# Patient Record
Sex: FEMALE | Race: WHITE | URBAN - METROPOLITAN AREA
[De-identification: names, ages, dates, MRNs, and addresses within clinical notes are randomized per-mention and may not be internally consistent; named-entity substitution may affect disease eponyms.]

---

## 2021-09-13 ENCOUNTER — LAB ENCOUNTER (OUTPATIENT)
Dept: LAB | Facility: HOSPITAL | Age: 31
End: 2021-09-13
Attending: OBSTETRICS & GYNECOLOGY
Payer: COMMERCIAL

## 2021-09-13 ENCOUNTER — OFFICE VISIT (OUTPATIENT)
Dept: OBGYN CLINIC | Facility: CLINIC | Age: 31
End: 2021-09-13
Payer: COMMERCIAL

## 2021-09-13 VITALS — DIASTOLIC BLOOD PRESSURE: 60 MMHG | SYSTOLIC BLOOD PRESSURE: 100 MMHG | WEIGHT: 219.63 LBS

## 2021-09-13 DIAGNOSIS — N93.8 DUB (DYSFUNCTIONAL UTERINE BLEEDING): ICD-10-CM

## 2021-09-13 DIAGNOSIS — Z01.419 ENCOUNTER FOR WELL WOMAN EXAM WITH ROUTINE GYNECOLOGICAL EXAM: Primary | ICD-10-CM

## 2021-09-13 LAB
B-HCG SERPL-ACNC: <1 MIU/ML
BASOPHILS # BLD AUTO: 0.07 X10(3) UL (ref 0–0.2)
BASOPHILS NFR BLD AUTO: 0.7 %
DEPRECATED RDW RBC AUTO: 39.9 FL (ref 35.1–46.3)
EOSINOPHIL # BLD AUTO: 0.19 X10(3) UL (ref 0–0.7)
EOSINOPHIL NFR BLD AUTO: 1.8 %
ERYTHROCYTE [DISTWIDTH] IN BLOOD BY AUTOMATED COUNT: 13.2 % (ref 11–15)
HCT VFR BLD AUTO: 36.2 %
HGB BLD-MCNC: 11.3 G/DL
IMM GRANULOCYTES # BLD AUTO: 0.07 X10(3) UL (ref 0–1)
IMM GRANULOCYTES NFR BLD: 0.7 %
LYMPHOCYTES # BLD AUTO: 2.54 X10(3) UL (ref 1–4)
LYMPHOCYTES NFR BLD AUTO: 24.6 %
MCH RBC QN AUTO: 25.7 PG (ref 26–34)
MCHC RBC AUTO-ENTMCNC: 31.2 G/DL (ref 31–37)
MCV RBC AUTO: 82.3 FL
MONOCYTES # BLD AUTO: 0.44 X10(3) UL (ref 0.1–1)
MONOCYTES NFR BLD AUTO: 4.3 %
NEUTROPHILS # BLD AUTO: 7.01 X10 (3) UL (ref 1.5–7.7)
NEUTROPHILS # BLD AUTO: 7.01 X10(3) UL (ref 1.5–7.7)
NEUTROPHILS NFR BLD AUTO: 67.9 %
PLATELET # BLD AUTO: 532 10(3)UL (ref 150–450)
RBC # BLD AUTO: 4.4 X10(6)UL
T4 FREE SERPL-MCNC: 0.9 NG/DL (ref 0.8–1.7)
TSI SER-ACNC: 2.54 MIU/ML (ref 0.36–3.74)
WBC # BLD AUTO: 10.3 X10(3) UL (ref 4–11)

## 2021-09-13 PROCEDURE — 36415 COLL VENOUS BLD VENIPUNCTURE: CPT

## 2021-09-13 PROCEDURE — 84439 ASSAY OF FREE THYROXINE: CPT

## 2021-09-13 PROCEDURE — 84702 CHORIONIC GONADOTROPIN TEST: CPT

## 2021-09-13 PROCEDURE — 99203 OFFICE O/P NEW LOW 30 MIN: CPT | Performed by: OBSTETRICS & GYNECOLOGY

## 2021-09-13 PROCEDURE — 85025 COMPLETE CBC W/AUTO DIFF WBC: CPT

## 2021-09-13 PROCEDURE — 3078F DIAST BP <80 MM HG: CPT | Performed by: OBSTETRICS & GYNECOLOGY

## 2021-09-13 PROCEDURE — 84443 ASSAY THYROID STIM HORMONE: CPT

## 2021-09-13 PROCEDURE — 3074F SYST BP LT 130 MM HG: CPT | Performed by: OBSTETRICS & GYNECOLOGY

## 2021-09-13 NOTE — PROGRESS NOTES
Subjective:   Patient ID: Preet Cardenas is a 36year old female. Patient reports DUB since April. Prior to this patient reports oligomenorrhea. Had menses one or twice a year.   Discussed risk of uterine cancer with infrequent menses and discussed E routine gynecological exam  (primary encounter diagnosis)  DUB (dysfunctional uterine bleeding)  All questions answered. F/U pap smear and Labs. EMBx procedure discussed. To premedicate 30 minutes prior to biopsy.   Orders Placed This Encounter      Hpv

## 2021-09-14 ENCOUNTER — TELEPHONE (OUTPATIENT)
Dept: OBGYN CLINIC | Facility: CLINIC | Age: 31
End: 2021-09-14

## 2021-09-14 LAB — HPV I/H RISK 1 DNA SPEC QL NAA+PROBE: NEGATIVE

## 2021-09-14 NOTE — TELEPHONE ENCOUNTER
----- Message from Stefany Amaya MD sent at 9/13/2021  3:31 PM CDT -----  Thyroid function is normal,  Quant BHCG is Negative and CBC only shows very mild anemia. Patient can start taking Iron daily and she is to keep her appointment for an EMBx.   Agustin

## 2021-09-29 ENCOUNTER — OFFICE VISIT (OUTPATIENT)
Dept: OBGYN CLINIC | Facility: CLINIC | Age: 31
End: 2021-09-29
Payer: COMMERCIAL

## 2021-09-29 VITALS — WEIGHT: 220.81 LBS | SYSTOLIC BLOOD PRESSURE: 116 MMHG | DIASTOLIC BLOOD PRESSURE: 60 MMHG

## 2021-09-29 DIAGNOSIS — Z01.812 PRE-PROCEDURAL LABORATORY EXAMINATION: Primary | ICD-10-CM

## 2021-09-29 DIAGNOSIS — N93.8 DUB (DYSFUNCTIONAL UTERINE BLEEDING): ICD-10-CM

## 2021-09-29 DIAGNOSIS — N92.6 IRREGULAR MENSTRUAL CYCLE: ICD-10-CM

## 2021-09-29 LAB
CONTROL LINE PRESENT WITH A CLEAR BACKGROUND (YES/NO): YES YES/NO
KIT LOT #: NORMAL NUMERIC
PREGNANCY TEST, URINE: NEGATIVE

## 2021-09-29 PROCEDURE — 58100 BIOPSY OF UTERUS LINING: CPT | Performed by: OBSTETRICS & GYNECOLOGY

## 2021-09-29 PROCEDURE — 3078F DIAST BP <80 MM HG: CPT | Performed by: OBSTETRICS & GYNECOLOGY

## 2021-09-29 PROCEDURE — 3074F SYST BP LT 130 MM HG: CPT | Performed by: OBSTETRICS & GYNECOLOGY

## 2021-09-29 PROCEDURE — 81025 URINE PREGNANCY TEST: CPT | Performed by: OBSTETRICS & GYNECOLOGY

## 2021-09-29 RX ORDER — MEDROXYPROGESTERONE ACETATE 10 MG/1
10 TABLET ORAL DAILY
Qty: 11 TABLET | Refills: 0 | Status: SHIPPED | OUTPATIENT
Start: 2021-09-29 | End: 2021-12-08 | Stop reason: ALTCHOICE

## 2021-09-29 NOTE — PROCEDURES
Endometrial Biopsy    Pre-Procedure Care:   Consent was obtained. Procedure/risks were explained. Questions were answered. Correct patient was identified. Correct side and site were confirmed.     Pregnancy Results: negative from urine test       Pre-Me

## 2021-10-01 ENCOUNTER — TELEPHONE (OUTPATIENT)
Dept: OBGYN CLINIC | Facility: CLINIC | Age: 31
End: 2021-10-01

## 2021-10-01 NOTE — TELEPHONE ENCOUNTER
----- Message from Tabitha Mensah MD sent at 10/1/2021  9:20 AM CDT -----  EMBx is Normal.  No malignancy identified. Unable to contact patient. Please try and contact patient.   I started her on Provera and I would like to know if her bleeding is stop

## 2021-10-07 NOTE — TELEPHONE ENCOUNTER
Patient name and  verified. Patient informed of MLM result note. States currently taking provera and bleeding had decreased. Advised patient to finish 11 day course of Provera and to contact office if bleeding continues. Verbalized understanding.

## 2021-10-13 ENCOUNTER — TELEPHONE (OUTPATIENT)
Dept: OBGYN CLINIC | Facility: CLINIC | Age: 31
End: 2021-10-13

## 2021-10-13 DIAGNOSIS — N93.8 DUB (DYSFUNCTIONAL UTERINE BLEEDING): Primary | ICD-10-CM

## 2021-10-13 RX ORDER — NORGESTIMATE AND ETHINYL ESTRADIOL 0.25-0.035
KIT ORAL
Qty: 84 TABLET | Refills: 0 | Status: SHIPPED | OUTPATIENT
Start: 2021-10-13 | End: 2021-12-08

## 2021-10-13 NOTE — TELEPHONE ENCOUNTER
Please send in Ortho Cyclen x 3 packs. Patient should start today and armaan 1 tab TID x 4 days. On Sunday the patient should start a new pack but only take 1 tab daily.   After finishing the second pack she should start the third pack but also come and see

## 2021-10-13 NOTE — TELEPHONE ENCOUNTER
Patient informed of MLM note and recommendations. RX sent to pharmacy. Dosing schedule reviewed with patient and instructions written on RX. Aware to schedule follow up visit with MLM while on third pack of medication.  Verbalized understanding and encourag

## 2021-10-13 NOTE — TELEPHONE ENCOUNTER
Patient name and  verified. Patient recently finished 11 day course of Provera on Monday 10/11/2021 complaining of heavy bleeding. States she is changing pads every 2 hours and 4/10 abdominal pain. Bleeding precautions reviewed with patient.  Advised

## 2021-10-13 NOTE — TELEPHONE ENCOUNTER
Reviewed instructions per [facility] note, per Md since its late in the day. Pt to take 2 ocp tablets today, then take ocp TID tomorrow, Friday and Saturday. Pt then will discard the pack and start a new one on Sunday taking it, one tablet daily.  Pt to make apt w

## 2021-10-25 NOTE — TELEPHONE ENCOUNTER
Patient is doing the right thing. It may take two or three packs of pills to be regulated. Will see what CBC shows and also waiting to see Pelvic ultrasound report when patient has this done.

## 2021-10-25 NOTE — TELEPHONE ENCOUNTER
Pt is taking medication as prescribed but Saturday 10/23 pt started bleeding and is concerned. Please advise.

## 2021-10-25 NOTE — TELEPHONE ENCOUNTER
Order for CBC entered for patient to complete. Patient states she has been taking OCP as prescribed. First pack patient was taking ocp 3 times daily for 4 days and began new ocp pack Francisco 10/17.  States now only takes one pill daily at same time every

## 2021-10-25 NOTE — TELEPHONE ENCOUNTER
Patient name and  verified. Patient states she has been bleeding since April. Per patient she began OCP on 10/13/2021. States she noticed that bleeding decreased but heavy bleeding resumed 2 day ago.  States she has been passing all different sized c

## 2021-10-25 NOTE — TELEPHONE ENCOUNTER
Patient informed of MLM note and recommendations. Bleeding precautions reviewed again with patient. Verbalized understanding and no further questions.

## 2021-10-25 NOTE — TELEPHONE ENCOUNTER
Send the patient to the Lab of a CBC. Diagnosis is DUB. Also ask the patient how did the patient take the OCPs when she started on 10/13/2021 after talking to [facility]? Patient should continue the OCPs that she is currently taking. DO NOT STOP.   Call regina

## 2021-10-27 ENCOUNTER — HOSPITAL ENCOUNTER (OUTPATIENT)
Dept: ULTRASOUND IMAGING | Facility: HOSPITAL | Age: 31
Discharge: HOME | End: 2021-10-27
Attending: OBSTETRICS & GYNECOLOGY
Payer: COMMERCIAL

## 2021-10-27 ENCOUNTER — LAB ENCOUNTER (OUTPATIENT)
Dept: LAB | Facility: HOSPITAL | Age: 31
End: 2021-10-27
Attending: OBSTETRICS & GYNECOLOGY
Payer: COMMERCIAL

## 2021-10-27 DIAGNOSIS — N93.8 DUB (DYSFUNCTIONAL UTERINE BLEEDING): ICD-10-CM

## 2021-10-27 PROCEDURE — 36415 COLL VENOUS BLD VENIPUNCTURE: CPT

## 2021-10-27 PROCEDURE — 85025 COMPLETE CBC W/AUTO DIFF WBC: CPT

## 2021-10-27 PROCEDURE — 76830 TRANSVAGINAL US NON-OB: CPT | Performed by: OBSTETRICS & GYNECOLOGY

## 2021-10-27 PROCEDURE — 76856 US EXAM PELVIC COMPLETE: CPT | Performed by: OBSTETRICS & GYNECOLOGY

## 2021-10-28 ENCOUNTER — TELEPHONE (OUTPATIENT)
Dept: OBGYN CLINIC | Facility: CLINIC | Age: 31
End: 2021-10-28

## 2021-11-16 ENCOUNTER — TELEPHONE (OUTPATIENT)
Dept: OBGYN CLINIC | Facility: CLINIC | Age: 31
End: 2021-11-16

## 2021-11-16 NOTE — TELEPHONE ENCOUNTER
Patient does not need to come in. She should stop the pills for her Placebo week then start the new pack. Patient can take Motrin as directed for cramping. Call patient.

## 2021-11-16 NOTE — TELEPHONE ENCOUNTER
Pt wanted to let MLM she stopped bleeding but would like to speak with the nurse.  Please advise Algerian speaking

## 2021-11-16 NOTE — TELEPHONE ENCOUNTER
Pt Name and  verified. Pt is calling in regards to bleeding. States that she is no longer on her bleeding after this past . However, she does have pelvic cramping. Pt wants to know if she should come in for f/u apt.  Pt also wanted to know if she

## 2021-12-08 ENCOUNTER — TELEPHONE (OUTPATIENT)
Dept: OBGYN CLINIC | Facility: CLINIC | Age: 31
End: 2021-12-08

## 2021-12-08 RX ORDER — NORGESTIMATE AND ETHINYL ESTRADIOL 0.25-0.035
1 KIT ORAL DAILY
Qty: 28 TABLET | Refills: 0 | Status: SHIPPED | OUTPATIENT
Start: 2021-12-08 | End: 2021-12-16

## 2021-12-08 NOTE — TELEPHONE ENCOUNTER
Patient was not told to contact office once she finished her OCPs. She was told to come in for an appointment to see me when she was on her third pack before she finished it. I do think this is her period and she should start her next pack this Sunday.

## 2021-12-08 NOTE — TELEPHONE ENCOUNTER
Patient name and  verified. Patient with a history of irregular menses was informed to contact the office once she finished OCP pack. Patient states she started spotting yesterday and believes it may be her menses.  States flow is not heavy currently

## 2021-12-08 NOTE — TELEPHONE ENCOUNTER
Patient name and  verified. Patient informed of MLM message and recommendations. OCP order sent to pharmacy per [facility] message. Patient scheduled on 2021 for medication check. Aware of time and location.

## 2021-12-08 NOTE — TELEPHONE ENCOUNTER
Pt states she finished her medication and 2 days after started bleeding again.  Please advise Arabic speaking

## 2021-12-13 ENCOUNTER — TELEPHONE (OUTPATIENT)
Dept: OBGYN CLINIC | Facility: CLINIC | Age: 31
End: 2021-12-13

## 2021-12-13 NOTE — TELEPHONE ENCOUNTER
Pt needs instructions on dose for  Norgestimate.  She was suppose to start on RX yesterday  Kazakh speaking

## 2021-12-13 NOTE — TELEPHONE ENCOUNTER
Pt Name and  verified. Pt states that she was unable to  OCP last night as her insurance would not cover until today. Would like to know if she should take two pills today to get back on track as she didn't take it yesterday.  Advised that this i

## 2021-12-16 ENCOUNTER — OFFICE VISIT (OUTPATIENT)
Dept: OBGYN CLINIC | Facility: CLINIC | Age: 31
End: 2021-12-16
Payer: COMMERCIAL

## 2021-12-16 VITALS — SYSTOLIC BLOOD PRESSURE: 107 MMHG | DIASTOLIC BLOOD PRESSURE: 72 MMHG | WEIGHT: 211.19 LBS | HEART RATE: 97 BPM

## 2021-12-16 DIAGNOSIS — Z30.41 ENCOUNTER FOR SURVEILLANCE OF CONTRACEPTIVE PILLS: Primary | ICD-10-CM

## 2021-12-16 PROCEDURE — 99213 OFFICE O/P EST LOW 20 MIN: CPT | Performed by: OBSTETRICS & GYNECOLOGY

## 2021-12-16 PROCEDURE — 3074F SYST BP LT 130 MM HG: CPT | Performed by: OBSTETRICS & GYNECOLOGY

## 2021-12-16 PROCEDURE — 3078F DIAST BP <80 MM HG: CPT | Performed by: OBSTETRICS & GYNECOLOGY

## 2021-12-16 RX ORDER — NORGESTIMATE AND ETHINYL ESTRADIOL 0.25-0.035
1 KIT ORAL DAILY
Qty: 84 TABLET | Refills: 2 | Status: SHIPPED | OUTPATIENT
Start: 2021-12-16 | End: 2022-01-27 | Stop reason: ALTCHOICE

## 2021-12-16 NOTE — PROGRESS NOTES
Subjective:   Patient ID: Dolores Sage is a 36year old female. Patient here for medication check. On Ortho Cyclen to regulate irregular heavy menses. Menses much better. Desires to continue with OCPs. All questions answered.   This is a 20 madina

## 2022-01-10 ENCOUNTER — OFFICE VISIT (OUTPATIENT)
Dept: OBGYN CLINIC | Facility: CLINIC | Age: 32
End: 2022-01-10
Payer: COMMERCIAL

## 2022-01-10 ENCOUNTER — TELEPHONE (OUTPATIENT)
Dept: OBGYN CLINIC | Facility: CLINIC | Age: 32
End: 2022-01-10

## 2022-01-10 VITALS — WEIGHT: 211 LBS | SYSTOLIC BLOOD PRESSURE: 112 MMHG | DIASTOLIC BLOOD PRESSURE: 70 MMHG

## 2022-01-10 DIAGNOSIS — Z30.41 ENCOUNTER FOR SURVEILLANCE OF CONTRACEPTIVE PILLS: Primary | ICD-10-CM

## 2022-01-10 PROCEDURE — 99213 OFFICE O/P EST LOW 20 MIN: CPT | Performed by: OBSTETRICS & GYNECOLOGY

## 2022-01-10 PROCEDURE — 3074F SYST BP LT 130 MM HG: CPT | Performed by: OBSTETRICS & GYNECOLOGY

## 2022-01-10 PROCEDURE — 3078F DIAST BP <80 MM HG: CPT | Performed by: OBSTETRICS & GYNECOLOGY

## 2022-01-10 NOTE — PROGRESS NOTES
Subjective:   Patient ID: Ke Lopez is a 36year old female. Patient reports Nausea for months with OCPs even though she has never reported this before. Reports vomiting today. Desires to change contraceptive method.   Patient started OCPs to c This Visit:  Requested Prescriptions      No prescriptions requested or ordered in this encounter       Imaging & Referrals:  None

## 2022-01-10 NOTE — TELEPHONE ENCOUNTER
1/10/2022  verified with patient. Pt states \"she feels very nauseous for the past couple of months on the birth control. She couldn't tolerated today so she vomited. She feels that she need to be re-evaluated for a different type of birth control. Pt would like to be see today. \"        Appointment was schedule for today with Dr. Michelle Morelos at [place] [id number] at [facility]. Pt verbalized understanding, pt did not have any further questions.        FYI Dr. Michelle Morelos

## 2022-01-25 ENCOUNTER — OFFICE VISIT (OUTPATIENT)
Dept: OBGYN CLINIC | Facility: CLINIC | Age: 32
End: 2022-01-25
Payer: COMMERCIAL

## 2022-01-25 VITALS — DIASTOLIC BLOOD PRESSURE: 86 MMHG | SYSTOLIC BLOOD PRESSURE: 129 MMHG | WEIGHT: 211 LBS

## 2022-01-25 DIAGNOSIS — Z01.812 PRE-PROCEDURAL LABORATORY EXAMINATION: ICD-10-CM

## 2022-01-25 DIAGNOSIS — Z30.017 NEXPLANON INSERTION: Primary | ICD-10-CM

## 2022-01-25 PROCEDURE — 3079F DIAST BP 80-89 MM HG: CPT | Performed by: OBSTETRICS & GYNECOLOGY

## 2022-01-25 PROCEDURE — 3074F SYST BP LT 130 MM HG: CPT | Performed by: OBSTETRICS & GYNECOLOGY

## 2022-01-25 PROCEDURE — 99213 OFFICE O/P EST LOW 20 MIN: CPT | Performed by: OBSTETRICS & GYNECOLOGY

## 2022-01-25 PROCEDURE — 11981 INSERTION DRUG DLVR IMPLANT: CPT | Performed by: OBSTETRICS & GYNECOLOGY

## 2022-01-25 PROCEDURE — 81025 URINE PREGNANCY TEST: CPT | Performed by: OBSTETRICS & GYNECOLOGY

## 2022-01-28 NOTE — PROCEDURES
Nexplanon Insertion    Pregnancy Results: negative from urine test   Birth control method(s) used: OCPs  date last used: Stopped 4 days ago  Consent was obtained from the patient.   Pt counseled on side effects of Nexplanon including irregular bleeding, ski

## 2022-01-28 NOTE — PROGRESS NOTES
[facility]  Obstetrics and Gynecology  Focused Gynecology Problem Exam  Teri Helms MD    Yue Page is a 36year old female presenting for Consult (nexplanon insertion)  .     HPI:   Patient presents with:  Consult: nexplanon insertion    P distress    Procedure:   Nexplanon Insertion    Pregnancy Results: negative from urine test   Birth control method(s) used: OCPs  date last used: Stopped 4 days ago  Consent was obtained from the patient.   Pt counseled on side effects of Nexplanon includin not have regular menses. I discussed the option of a lower dose OCP with 10 or 20 mcg of estrogen.   I discussed IUD use including ParaGard IUD which would have no hormones versus the option of progesterone containing IUDs which can be beneficial for menst

## 2022-06-23 ENCOUNTER — E-VISIT (OUTPATIENT)
Dept: TELEHEALTH | Age: 32
End: 2022-06-23

## 2022-06-23 ENCOUNTER — APPOINTMENT (OUTPATIENT)
Dept: GENERAL RADIOLOGY | Age: 32
End: 2022-06-23
Attending: PHYSICIAN ASSISTANT
Payer: COMMERCIAL

## 2022-06-23 ENCOUNTER — HOSPITAL ENCOUNTER (OUTPATIENT)
Age: 32
Discharge: HOME | End: 2022-06-23
Payer: COMMERCIAL

## 2022-06-23 VITALS
SYSTOLIC BLOOD PRESSURE: 113 MMHG | OXYGEN SATURATION: 99 % | BODY MASS INDEX: 35 KG/M2 | DIASTOLIC BLOOD PRESSURE: 66 MMHG | RESPIRATION RATE: 20 BRPM | HEIGHT: 65 IN | HEART RATE: 82 BPM | TEMPERATURE: 98 F

## 2022-06-23 DIAGNOSIS — M54.50 ACUTE BILATERAL LOW BACK PAIN WITHOUT SCIATICA: ICD-10-CM

## 2022-06-23 DIAGNOSIS — M62.838 SPASM OF MUSCLE: ICD-10-CM
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
## 2022-06-23 DIAGNOSIS — Z02.9 ENCOUNTER FOR ADMINISTRATIVE EXAMINATIONS, UNSPECIFIED: Primary | ICD-10-CM

## 2022-06-23 LAB — B-HCG UR QL: NEGATIVE

## 2022-06-23 PROCEDURE — 71046 X-RAY EXAM CHEST 2 VIEWS: CPT | Performed by: PHYSICIAN ASSISTANT

## 2022-06-23 PROCEDURE — 81025 URINE PREGNANCY TEST: CPT

## 2022-06-23 PROCEDURE — 99204 OFFICE O/P NEW MOD 45 MIN: CPT

## 2022-06-23 PROCEDURE — 96372 THER/PROPH/DIAG INJ SC/IM: CPT

## 2022-06-23 PROCEDURE — 72100 X-RAY EXAM L-S SPINE 2/3 VWS: CPT | Performed by: PHYSICIAN ASSISTANT

## 2022-06-23 PROCEDURE — 99214 OFFICE O/P EST MOD 30 MIN: CPT

## 2022-06-23 RX ORDER — IBUPROFEN 600 MG/1
600 TABLET ORAL EVERY 8 HOURS PRN
Qty: 15 TABLET | Refills: 0 | Status: SHIPPED | OUTPATIENT
Start: 2022-06-23 | End: 2022-06-28

## 2022-06-23 RX ORDER — KETOROLAC TROMETHAMINE 30 MG/ML
60 INJECTION, SOLUTION INTRAMUSCULAR; INTRAVENOUS ONCE
Status: COMPLETED | OUTPATIENT
Start: 2022-06-23 | End: 2022-06-23

## 2022-06-23 RX ORDER — TIZANIDINE 4 MG/1
4 TABLET ORAL 3 TIMES DAILY
Qty: 21 TABLET | Refills: 0 | Status: SHIPPED | OUTPATIENT
Start: 2022-06-23 | End: 2022-06-30
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
## 2025-03-14 ENCOUNTER — TELEPHONE (OUTPATIENT)
Dept: SURGERY | Facility: CLINIC | Age: 35
End: 2025-03-14

## 2025-03-14 NOTE — TELEPHONE ENCOUNTER
Patient has referral for cholelithiasis from PCP and asking to schedule with Dr. Anne. Please call.

## 2025-03-31 ENCOUNTER — OFFICE VISIT (OUTPATIENT)
Dept: SURGERY | Facility: CLINIC | Age: 35
End: 2025-03-31
Payer: COMMERCIAL

## 2025-03-31 VITALS
HEIGHT: 65 IN | WEIGHT: 215 LBS | HEART RATE: 90 BPM | DIASTOLIC BLOOD PRESSURE: 75 MMHG | BODY MASS INDEX: 35.82 KG/M2 | SYSTOLIC BLOOD PRESSURE: 116 MMHG

## 2025-03-31 DIAGNOSIS — K80.20 GALLSTONES: Primary | ICD-10-CM

## 2025-03-31 DIAGNOSIS — R10.11 RUQ PAIN: ICD-10-CM

## 2025-03-31 PROCEDURE — 99204 OFFICE O/P NEW MOD 45 MIN: CPT | Performed by: SURGERY

## 2025-03-31 NOTE — H&P
Chief complaint:   Chief Complaint   Patient presents with    New Patient    Gallstones       HPI: My presents for consult for RUQ pain and gallstones.  US and MRI reviewed, scanned into chart. She has a liver lesion for which an MRI was obtained and repeat MRI in 6 mos is recommended. No h/o J/F/C.     Past medical history: History reviewed. No pertinent past medical history.    Past surgical history: History reviewed. No pertinent surgical history.    Allergies: Allergies[1]    Medications:   No current outpatient medications on file.       Social history:   Social History     Socioeconomic History    Marital status: Single   Tobacco Use    Smoking status: Every Day    Smokeless tobacco: Current        Family history:  History reviewed. No pertinent family history.     Review of Systems:   GENERAL: feels generally well  SKIN: no ulcerated or worrisome skin lesions  EYES:denies blurred vision or double vision  HEENT: denies new nasal congestion, sinus pain or ST  LUNGS: denies shortness of breath with exertion  CARDIOVASCULAR: denies chest pain on exertion  GI: no hematemesis, no BRBPR, no worsening heartburn  : no dysuria, no blood in urine, no difficulty urinating  MUSCULOSKELETAL: no new musculoskeletal complaints  NEURO: no persistent, recurrent  headaches  PSYCHE:no depression or anxiety  HEMATOLOGIC: no hx of blood dyscrasia  ENDOCRINE: no new endocrine problems  ALL/ASTHMA: no new hx of severe allergy or asthma  BACK: normal, no spinal deformity, no CVA tenderness    Physical examination:     Constitutional: appears well hydrated alert and responsive no acute distress noted  HEENT wnl, anicteric, PERRL, normocephalic, atraumatic  Neck supple, norm ROM, no JVD  L CTA B  H Reg rate  Abd soft, NT, ND, no masses, no hernias, no HSM.  Extr no c/c/e  Skin intact, no jaundice, no rashes, no lesions  Neuro grossly intact, no focal deficits, no tremors  Back no deformity, no CVA tnd.         Assessment and  plan:  Diagnoses and all orders for this visit:    Gallstones    RUQ pain       Plan lap fuad, possible open, possible IOC  We have discussed the surgical risks, benefits, alternatives, and expected recovery. All of the patient's questions have been answered to her satisfaction.      Idalmis Mark MD  3/31/2025  9:30 AM       [1] No Known Allergies

## 2025-04-04 NOTE — DISCHARGE INSTRUCTIONS
Keep steri strips on, change gauze if saturated as needed, may leave gauze and tegaderm dressing on as long as it is dry and comfortable.  Low fat small meals, avoid dairy for 1-2 weeks  Tylenol and Ibuprofen should be adequate for pain  No lifting > 10 lbs  No driving  See Nate Sandy or Blossom Mesa PA  in approximately 2 wk  May use ice pack on incision prn  Shower starting tomorrow, no submersion in bath/hot tub/pool   Call with any concerns      Mantenga las tiras estériles puestas, cambie la gasa si está saturada según sea necesario. Puede dejar la gasa y el apósito Tegaderm puestos mientras esté seco y cómodo.  Comidas pequeñas bajas en grasa; evite los lácteos lavern 1 a 2 semanas. El Tylenol y el ibuprofeno deberían ser suficientes para el dolor.  No levante más de 4.5 kg (10 lb)  No conduzca.  Consulte con Nate Sandy o Blossom Mesa, asistente médico, en aproximadamente 2 semanas. Puede usar roly compresa de hielo sobre la incisión según sea necesario.  Ducha a partir de mañana; no se permite la inmersión en bañeras, jacuzzis ni piscinas.  Llame si tiene alguna inquietud.  ____________________________________________________        CIRUGIA AMBULATORIA: INSTRUCCIONES DESPUES DE JOSEPHINE RECIBIDO ANESTESIA  Debido a la Anestesia y a las medicamentos que se le aplicaron lavern la cirugia, judson reflejos y capacidaddiscernimiento pueden verse afectados. Tambien podria tener un poco de mareo.Aparte de siguir las precauciones de sentico comun, le recomendamos lo siguiente:     El paciente debe estar acompañado por alguien hasta la mañana siguiente.     No maneje ningun vehiculo automotor ni monte bicicleta.     No tome ninguna decision importante cynthia por ejemplo firmar de documentos importantes.     No opere herramientas electricas ni electrodomesticos, tales cynthia cuchillos electricos, batidoras electricas o serruchos electricos. No dakotah el cesped con cortadoras electricas. No practique deportes.     No narciso  ejercicio.     Para evitar las nauseas, coma menos de lo normal mas o menos la mitad de lo habitual y/o clint solo liquidos hasta la manana siguiente. Consulte con abdullahi doctor si esta llevando roly dieta especial.     No tome bebidas alcoholicas, tranquilizantes, pastilles para dormir etc., y verifique con abdullahi doctor acerca de cualquier medicamento que lexi tomando actualmente.     El efecto de la medicación usada en abdullahi anestesia habra pasado yonatan por completo a la medianoche. Por lo tanto, puede reanudar judson habitos cotidianos en la mañana.     Los adultos deben descansar lo eric posible por las siguientes 24 horas. Los niños deben permanecer en cama lo eric posible por las siguientes 24 horas.     Si se presenta cualquier problema, puede llamar a abdullahi propio doctor personal o aceda al centro de [facility].    Si sigue estas instrucciones, se sentira major y estara mas seguro despues de abdullahi cirugia ambulatoria. Sitiene cualquier pregunta, llame al hospital y pida que lo comuniquen con la enfermera de cirugia [phone], extension [phone].    Instrucciones para el kit: después de abdullahi cirugía   Acaba de someterse a roly cirugía. Jessica la cirugía, le administraron un tipo de medicamento llamado anestesia para que esté relajado y no sienta dolor. Después de la cirugía, eleazar vez sienta algo de dolor o náuseas. [place] es común. Estos son algunos consejos para sentirse mejor y recuperarse jimmy después de la cirugía.   El regreso a casa  Abdullahi proveedor de atención médica le enseñará cómo cuidarse cuando regrese a abdullahi casa. También responderá judson preguntas. Pida a un familiar o amigo adulto que lo conduzca a abdullahi casa. Jessica las primeras 24 horas después de la cirugía, siga estas recomendaciones:   No conduzca ni use maquinaria pesada.  No tome decisiones importantes ni firme ningún documento legal.  Adminístrese los medicamentos según las indicaciones.  Evite el consumo de alcohol.  Si es  necesario, coordine para que alguien se quede con usted. Esta persona puede vigilar cualquier problema que se presente y lo ayudará a permanecer seguro.  Asegúrese de asistir a todas judson visitas de control con abdullahi proveedor de atención médica. Y descanse después de la cirugía lavern el tiempo que le indique abdullahi proveedor.   Cómo sobrellevar el dolor  Si siente dolor después de la cirugía, los analgésicos lo ayudarán a sentirse mejor. [place] los analgésicos según las indicaciones, antes de que el dolor se intensifique. Además, pregunte a abdullahi proveedor de atención médica o al farmacéutico acerca de otras formas de controlar el dolor. Estas podrían incluir aplicar calor o hielo, o hacer ejercicios de relajación. Y siga todas las instrucciones que le dé abdullahi cirujano o enfermero.      Cumpla el cronograma de judson medicamentos.     Consejos para mallory analgésicos  Para aliviar el dolor lo prashant posible, recuerde estos puntos:   Los analgésicos pueden causar malestar estomacal. Tomarlos con un poco de comida puede aliviar yarelis efecto.  La mayoría de los calmantes que se cyrus por la boca necesitan por lo menos de 20 a 30 minutos para surtir efecto.  No espere hasta que abdullahi dolor se vuelva intenso para mallory el analgésico que le indicaron. Intente que el momento en que puede mallory abdullahi medicamento coincida con otra actividad. Yarelis podría ser el momento antes de vestirse, gil un paseo o sentarse a la elise para cenar.  El estreñimiento es un efecto secundario frecuente de algunos analgésicos. Consulte a abdullahi proveedor de atención médica antes de usar cualquier medicamento, cynthia laxantes o ablandadores de heces, para ayudar a aliviar el estreñimiento. También consulte si es preciso evitar algún tipo de alimento. Mallory mucha cantidad de líquido y comer alimentos cynthia frutas y verduras con alto contenido de fibra también puede ser beneficioso. Recuerde que no debe mallory laxantes a menos que abdullahi cirujano se los indique.  Mezclar bebidas  alcohólicas y analgésicos puede causar mareos y enlentecer abdullahi respiración. Y hasta puede ser mortal. No clint alcohol mientras esté tomando calmantes.  Los analgésicos pueden hacer que tenga reacciones más lentas. No conduzca ni opere maquinaria mientras esté tomando analgésicos.  Abdullahi proveedor de atención médica puede indicarle que tome acetaminofén (paracetamol) para ayudar a aliviar el dolor. Pregúntele qué cantidad debe girma por día. El acetaminofén y otros analgésicos pueden interactuar con judson medicamentos recetados u otros medicamentos de venta delfina (OTC, por judson siglas en inglés). Algunos medicamentos recetados contienen acetaminofén y otros ingredientes. Combinar medicamentos recetados y acetaminofén de venta delfina para aliviar el dolor puede provocarle roly sobredosis accidental. Paula atentamente la etiqueta del envase de judson medicamentos OTC. [place] lo ayudará a saber con exactitud la lista de ingredientes, la cantidad que debe girma y cualquier advertencia. [place] también puede ayudarlo a evitar girma demasiado acetaminofén. Si tiene preguntas o no entiende la información, pídale a abdullahi farmacéutico o proveedor de atención médica que se la explique antes de girma el medicamento OTC.   Manejo de las náuseas  Algunas personas pueden sentir malestar estomacal (náuseas) después de la cirugía. [place] suele suceder debido a la anestesia, el dolor, los analgésicos, la disminución del movimiento de la comida en el estómago o el estrés de la cirugía. Estos consejos lo ayudarán a manejar las náuseas y a comer alimentos más saludables mientras se recupera. Si seguía un plan alimentario especial antes de la cirugía, pregúntele a abdullahi proveedor de atención médica si debe continuarlo mientras se recupera. Consulte con abdullahi proveedor cómo debería continuar abdullahi alimentación. Esta puede variar según el tipo de cirugía a la que se sometió. Los siguientes consejos generales pueden serle útiles:   No se fuerce a comer. Guíese por abdullahi  cuerpo para saber cuándo comer y qué cantidad.  Comience con líquidos transparentes y sopa. Estos son más fáciles de digerir.  Tan pronto cynthia se sienta listo, intente comer alimentos semisólidos. Estos incluyen puré de morelia, puré de manzana y gelatina.  Lentamente, pase a alimentos sólidos. Al principio no coma alimentos grasosos, pesados ni condimentados.  No se fuerce a hacer brett comidas grandes al día. En cambio, coma cantidades pequeñas, arthur con mayor frecuencia.  [place] los analgésicos con roly pequeña cantidad de alimentos sólidos, cynthia galletas saladas o roly tostada. [place] ayuda a prevenir las náuseas.  Cuándo llamar a abdullahi proveedor de atención médica   Llame de inmediato a abdullahi proveedor de atención médica si nota alguno de los siguientes síntomas:   Sigue teniendo mucho dolor, o el dolor empeora, después de girma el medicamento. Puede que el medicamento no sea lo suficientemente brandi. O jimmy, puede ceilo complicaciones de la cirugía.  Se siente demasiado somnoliento, mareado o adormecido. Quizás el medicamento sea demasiado brandi.  Tiene efectos secundarios, cynthia náuseas o vómitos. Abdullahi proveedor de atención médica puede recomendarle girma otros medicamentos.  Tiene cambios en la piel, cynthia sarpullido, picazón o urticaria. [place] puede significar que tiene roly reacción alérgica. Abdullahi proveedor puede recomendarle girma otros medicamentos.  La incisión tiene un aspecto diferente (por ejemplo, se abre roly parte).  Tiene sangrado o supuración de líquido de la herida y no le dijeron que eso era esperable.  Fiebre de 100.4 °F (38 °C) o más, o según le indique abdullahi proveedor.  Cuándo llamar al 911  Llame al  911  de inmediato si tiene:   Dificultad para respirar  Becky hinchada    Si tiene apnea del sueño obstructiva   Jessica la cirugía, le administraron anestesia para que esté cómodo y no sienta dolor. Después de la cirugía, es probable que tenga más ataques de apnea causados por la anestesia y otros medicamentos que le  administraron. Los ataques pueden durar más de lo habitual.    En abdullahi casa, narciso lo siguiente:  Cuando duerma, siga usando abdullahi dispositivo de presión positiva continua en las vías respiratorias (CPAP, por judson siglas en inglés). A menos que abdullahi proveedor de atención médica le indique lo contrario, úselo siempre que duerma, ya sea de día o de noche. El dispositivo de CPAP suele usarse para tratar la apnea obstructiva del sueño.  Consulte a abdullahi proveedor antes de girma cualquier analgésico, relajante muscular o sedante. Abdullahi proveedor le dará información sobre los peligros de girma estos medicamentos.  Comuníquese con abdullahi proveedor si tiene el sueño demasiado alterado, incluso cuando esté tomando los medicamentos según las instrucciones.  © 6960-1786 [employer]. Todos los derechos reservados. Esta información no pretende sustituir la atención médica profesional. Sólo abdullahi médico puede diagnosticar y tratar un problema de rancho.

## 2025-04-08 ENCOUNTER — ANESTHESIA (OUTPATIENT)
Dept: SURGERY | Facility: HOSPITAL | Age: 35
End: 2025-04-08
Payer: COMMERCIAL

## 2025-04-08 ENCOUNTER — HOSPITAL ENCOUNTER (OUTPATIENT)
Facility: HOSPITAL | Age: 35
Setting detail: HOSPITAL OUTPATIENT SURGERY
Discharge: HOME | End: 2025-04-08
Attending: SURGERY | Admitting: SURGERY
Payer: COMMERCIAL

## 2025-04-08 ENCOUNTER — ANESTHESIA EVENT (OUTPATIENT)
Dept: SURGERY | Facility: HOSPITAL | Age: 35
End: 2025-04-08
Payer: COMMERCIAL

## 2025-04-08 VITALS
WEIGHT: 215 LBS | TEMPERATURE: 98 F | RESPIRATION RATE: 20 BRPM | BODY MASS INDEX: 35.82 KG/M2 | DIASTOLIC BLOOD PRESSURE: 75 MMHG | HEART RATE: 81 BPM | OXYGEN SATURATION: 93 % | SYSTOLIC BLOOD PRESSURE: 125 MMHG | HEIGHT: 65 IN

## 2025-04-08 DIAGNOSIS — R10.11 RUQ PAIN: ICD-10-CM

## 2025-04-08 DIAGNOSIS — K80.20 GALLSTONES: ICD-10-CM

## 2025-04-08 LAB — B-HCG UR QL: NEGATIVE

## 2025-04-08 PROCEDURE — 0FT44ZZ RESECTION OF GALLBLADDER, PERCUTANEOUS ENDOSCOPIC APPROACH: ICD-10-PCS | Performed by: SURGERY

## 2025-04-08 PROCEDURE — 47562 LAPAROSCOPIC CHOLECYSTECTOMY: CPT | Performed by: SURGERY

## 2025-04-08 RX ORDER — MORPHINE SULFATE 4 MG/ML
4 INJECTION, SOLUTION INTRAMUSCULAR; INTRAVENOUS EVERY 10 MIN PRN
Status: DISCONTINUED | OUTPATIENT
Start: 2025-04-08 | End: 2025-04-08

## 2025-04-08 RX ORDER — METOCLOPRAMIDE 10 MG/1
10 TABLET ORAL ONCE
Status: COMPLETED | OUTPATIENT
Start: 2025-04-08 | End: 2025-04-08

## 2025-04-08 RX ORDER — MORPHINE SULFATE 10 MG/ML
6 INJECTION, SOLUTION INTRAMUSCULAR; INTRAVENOUS EVERY 10 MIN PRN
Status: DISCONTINUED | OUTPATIENT
Start: 2025-04-08 | End: 2025-04-08

## 2025-04-08 RX ORDER — ONDANSETRON 2 MG/ML
4 INJECTION INTRAMUSCULAR; INTRAVENOUS ONCE
Status: COMPLETED | OUTPATIENT
Start: 2025-04-08 | End: 2025-04-08

## 2025-04-08 RX ORDER — ROCURONIUM BROMIDE 10 MG/ML
INJECTION, SOLUTION INTRAVENOUS AS NEEDED
Status: DISCONTINUED | OUTPATIENT
Start: 2025-04-08 | End: 2025-04-08 | Stop reason: SURG

## 2025-04-08 RX ORDER — NALOXONE HYDROCHLORIDE 0.4 MG/ML
0.08 INJECTION, SOLUTION INTRAMUSCULAR; INTRAVENOUS; SUBCUTANEOUS AS NEEDED
Status: DISCONTINUED | OUTPATIENT
Start: 2025-04-08 | End: 2025-04-08

## 2025-04-08 RX ORDER — METOCLOPRAMIDE HYDROCHLORIDE 5 MG/ML
10 INJECTION INTRAMUSCULAR; INTRAVENOUS ONCE
Status: COMPLETED | OUTPATIENT
Start: 2025-04-08 | End: 2025-04-08

## 2025-04-08 RX ORDER — SODIUM CHLORIDE, SODIUM LACTATE, POTASSIUM CHLORIDE, CALCIUM CHLORIDE 600; 310; 30; 20 MG/100ML; MG/100ML; MG/100ML; MG/100ML
INJECTION, SOLUTION INTRAVENOUS CONTINUOUS
Status: DISCONTINUED | OUTPATIENT
Start: 2025-04-08 | End: 2025-04-08

## 2025-04-08 RX ORDER — BUPIVACAINE HYDROCHLORIDE AND EPINEPHRINE 5; 5 MG/ML; UG/ML
INJECTION, SOLUTION PERINEURAL AS NEEDED
Status: DISCONTINUED | OUTPATIENT
Start: 2025-04-08 | End: 2025-04-08 | Stop reason: HOSPADM

## 2025-04-08 RX ORDER — HYDROMORPHONE HYDROCHLORIDE 1 MG/ML
0.2 INJECTION, SOLUTION INTRAMUSCULAR; INTRAVENOUS; SUBCUTANEOUS EVERY 5 MIN PRN
Status: DISCONTINUED | OUTPATIENT
Start: 2025-04-08 | End: 2025-04-08

## 2025-04-08 RX ORDER — MORPHINE SULFATE 4 MG/ML
2 INJECTION, SOLUTION INTRAMUSCULAR; INTRAVENOUS EVERY 10 MIN PRN
Status: DISCONTINUED | OUTPATIENT
Start: 2025-04-08 | End: 2025-04-08

## 2025-04-08 RX ORDER — FAMOTIDINE 20 MG/1
20 TABLET, FILM COATED ORAL ONCE
Status: COMPLETED | OUTPATIENT
Start: 2025-04-08 | End: 2025-04-08

## 2025-04-08 RX ORDER — IBUPROFEN 600 MG/1
600 TABLET, FILM COATED ORAL EVERY 8 HOURS PRN
Qty: 20 TABLET | Refills: 0 | Status: SHIPPED | OUTPATIENT
Start: 2025-04-08

## 2025-04-08 RX ORDER — HYDROMORPHONE HYDROCHLORIDE 1 MG/ML
0.6 INJECTION, SOLUTION INTRAMUSCULAR; INTRAVENOUS; SUBCUTANEOUS EVERY 5 MIN PRN
Status: DISCONTINUED | OUTPATIENT
Start: 2025-04-08 | End: 2025-04-08

## 2025-04-08 RX ORDER — ONDANSETRON 2 MG/ML
INJECTION INTRAMUSCULAR; INTRAVENOUS AS NEEDED
Status: DISCONTINUED | OUTPATIENT
Start: 2025-04-08 | End: 2025-04-08 | Stop reason: SURG

## 2025-04-08 RX ORDER — FAMOTIDINE 10 MG/ML
20 INJECTION, SOLUTION INTRAVENOUS ONCE
Status: COMPLETED | OUTPATIENT
Start: 2025-04-08 | End: 2025-04-08

## 2025-04-08 RX ORDER — HYDROCODONE BITARTRATE AND ACETAMINOPHEN 5; 325 MG/1; MG/1
1 TABLET ORAL EVERY 6 HOURS PRN
Qty: 10 TABLET | Refills: 0 | Status: SHIPPED | OUTPATIENT
Start: 2025-04-08

## 2025-04-08 RX ORDER — ACETAMINOPHEN 500 MG
1000 TABLET ORAL ONCE
Status: COMPLETED | OUTPATIENT
Start: 2025-04-08 | End: 2025-04-08

## 2025-04-08 RX ORDER — HYDROMORPHONE HYDROCHLORIDE 1 MG/ML
0.4 INJECTION, SOLUTION INTRAMUSCULAR; INTRAVENOUS; SUBCUTANEOUS EVERY 5 MIN PRN
Status: DISCONTINUED | OUTPATIENT
Start: 2025-04-08 | End: 2025-04-08

## 2025-04-08 RX ORDER — HYDROCODONE BITARTRATE AND ACETAMINOPHEN 5; 325 MG/1; MG/1
1 TABLET ORAL ONCE
Status: COMPLETED | OUTPATIENT
Start: 2025-04-08 | End: 2025-04-08

## 2025-04-08 RX ADMIN — ROCURONIUM BROMIDE 50 MG: 10 INJECTION, SOLUTION INTRAVENOUS at 07:36:00

## 2025-04-08 RX ADMIN — ONDANSETRON 4 MG: 2 INJECTION INTRAMUSCULAR; INTRAVENOUS at 07:36:00

## 2025-04-08 NOTE — INTERVAL H&P NOTE
Pre-op Diagnosis: Gallstones [K80.20]  RUQ pain [R10.11]    The above referenced H&P was reviewed by Idalmis Mark MD on 4/8/2025, the patient was examined and no significant changes have occurred in the patient's condition since the H&P was performed.  I discussed with the patient and/or legal representative the potential benefits, risks and side effects of this procedure; the likelihood of the patient achieving goals; and potential problems that might occur during recuperation.  I discussed reasonable alternatives to the procedure, including risks, benefits and side effects related to the alternatives and risks related to not receiving this procedure.  We will proceed with procedure as planned.

## 2025-04-08 NOTE — ANESTHESIA POSTPROCEDURE EVALUATION
Patient: My Quiroga    Procedure Summary       Date: 04/08/25 Room / Location: [facility] MAIN OR  / [facility] MAIN OR    Anesthesia Start: 0730 Anesthesia Stop:     Procedure: Laparoscopic cholecystectomy (Abdomen) Diagnosis:       Gallstones      RUQ pain      (Gallstones [K80.20]RUQ pain [R10.11])    Surgeons: Idalmis Mark MD Anesthesiologist: Guerrero Garcia MD    Anesthesia Type: general ASA Status: 2            Anesthesia Type: general    Vitals Value Taken Time   /79 04/08/25 0845   Temp 97.8 °F (36.6 °C) 04/08/25 0835   Pulse 87 04/08/25 0848   Resp 25 04/08/25 0848   SpO2 95 % 04/08/25 0848   Vitals shown include unfiled device data.    [facility] AN Post Evaluation:   Patient Evaluated in PACU  Patient Participation: complete - patient participated  Level of Consciousness: awake and awake and alert  Pain Score: 2  Pain Management: adequate  Airway Patency:patent  Dental exam unchanged from preop  Yes    Nausea/Vomiting: none  Cardiovascular Status: acceptable, blood pressure returned to baseline and hemodynamically stable  Respiratory Status: acceptable, unassisted and spontaneous ventilation  Postoperative Hydration stable      Guerrero Garcia MD  4/8/2025 8:49 AM

## 2025-04-08 NOTE — OPERATIVE REPORT
Patient Name:  My Quiroga  MR:  [id number]  :  4/15/1990  DOS:  27    Preop Dx:  Gallstones [K80.20]  RUQ pain [R10.11]  Postop Dx:  Gallstones [K80.20]RUQ pain [R10.11]  Procedure:  Laparoscopic cholecystectomy  Surgeon:  Idalmis Mark MD  Surgical Assistant.: Hue Archer CSA  EBL: No data recorded  Complication:  None    INDICATION:  Pt is a 34 year old female who has had abdominal pain, fatty food intolerance, bloating and gas, with Gallstones [K80.20]  RUQ pain [R10.11] who is scheduled for a Laparoscopic cholecystectomy.    CONSENT:  An informed consent discussion was held with the patient regarding the nature of Gallstones [K80.20]  RUQ pain [R10.11], the treatment options and the details of the procedure.  The risks including but not limited to bleeding, wound infection, intra-abdominal infection, injury to the liver, colon, small intestine, pancreas, stomach, common bile duct, incomplete resection, cystic duct stump leak, retained stone and incisional hernia were discussed.  The patient expressed understanding and want to proceed with the planned procedure.    TECHNIQUE:  The patient was taken to the OR and placed in supine position.  General anesthesia was established and the abdomen was prepped in standard fashion.  Pneumoperitoneum was obtained using open technique through a supra-umbilical incision.  A 12-mm trocar was inserted under direct visualization and no injury occurred.  Examination of the abdomen showed a thickened and inflamed appearing gallbladder consistent with Gallstones [K80.20]  RUQ pain [R10.11].  Two 5-mm trocars were placed in the epigastric and right abdomen.  The patient was placed in reverse Trendelenburg position.  The gallbladder was grasped and retracted cephalad and to the right.  The peritoneum along the medial and lateral aspect of the gallbladder/liver edge were freed with the [place] dissector, small lymphatics were divided using the hook  cautery.  The lower 1/3 of the gallbladder was dissected from the liver.  Two structures, identified as the cystic duct and artery, were visualized circumferentially with the 30 degree lens and noted to be entering the infundibulum, thus obtaining a critical view of safety.  The cystic duct and artery were clipped and divided.  The gallbladder was detached from the liver using hook cautery and delivered from the abdomen using an endocatch bag.  The abdominal cavity was irrigated with saline and found to be hemostatic.  The trocars were removed under direct visualization and no port site bleeding was seen.  The supra-umbilical fascia was closed using 0 vicryl.  The skin incisions were closed using 4-0 vicryl.  Sterile dressings were applied.  All instrument and sponge counts were correct.  I was present during the entire procedure and performed the operation.

## 2025-04-08 NOTE — ANESTHESIA PREPROCEDURE EVALUATION
Anesthesia PreOp Note    HPI:     My Quiroga is a 34 year old female who presents for preoperative consultation requested by: Idalmis Mark MD    Date of Surgery: 4/8/2025    Procedure(s):  Laparoscopic cholecystectomy,possible open,possible cholangiogram  Indication: Gallstones [K80.20]  RUQ pain [R10.11]    Relevant Problems   No relevant active problems       NPO:  Last Liquid Consumption Date: 04/07/25  Last Liquid Consumption Time: 2100  Last Solid Consumption Date: 04/07/25  Last Solid Consumption Time: 2000  Last Liquid Consumption Date: 04/07/25          History Review:  Patient Active Problem List    Diagnosis Date Noted    DUB (dysfunctional uterine bleeding) 09/13/2021       Past Medical History:    Disorder of liver    Hx of motion sickness    Visual impairment    wear glasses       Past Surgical History:   Procedure Laterality Date    Patient denies any surgical history         Prescriptions Prior to Admission[1]  Current Medications and Prescriptions Ordered in Epic[2]    Allergies[3]    Family History   Problem Relation Age of Onset    No Known Problems Father     Hypertension Mother     No Known Problems Sister     No Known Problems Brother      Social History     Socioeconomic History    Marital status: Single   Tobacco Use    Smoking status: Every Day     Types: Cigarettes    Smokeless tobacco: Never   Vaping Use    Vaping status: Never Used   Substance and Sexual Activity    Alcohol use: Not Currently     Comment: occ    Drug use: Never       Available pre-op labs reviewed.  Lab Results   Component Value Date    URINEPREG Negative 04/08/2025             Vital Signs:  Body mass index is 35.78 kg/m².   height is 1.651 m (5' 5\") and weight is 97.5 kg (215 lb). Her oral temperature is 98.4 °F (36.9 °C). Her blood pressure is 116/72 and her pulse is 87. Her respiration is 16 and oxygen saturation is 98%.   Vitals:    04/04/25 1730 04/08/25 0615   BP:  116/72   Pulse:  87   Resp:  16   Temp:   98.4 °F (36.9 °C)   TempSrc:  Oral   SpO2:  98%   Weight: 97.5 kg (215 lb) 97.5 kg (215 lb)   Height: 1.651 m (5' 5\")         Anesthesia Evaluation     Patient summary reviewed and Nursing notes reviewed    Airway   Mallampati: I  TM distance: >3 FB  Neck ROM: full  Dental - Dentition appears grossly intact     Pulmonary - negative ROS and normal exam    breath sounds clear to auscultation  Cardiovascular - negative ROS  Exercise tolerance: good    ECG reviewed  Rhythm: regular  Rate: normal    Neuro/Psych - negative ROS     GI/Hepatic/Renal - negative ROS   (+) liver disease    Endo/Other - negative ROS   Abdominal                  Anesthesia Plan:   ASA:  2  Plan:   General  Airway:  ETT  Post-op Pain Management: IV analgesics  Informed Consent Plan and Risks Discussed With:  Patient      I have informed Debianajuanita Quiroga and/or legal guardian or family member of the nature of the anesthetic plan, benefits, risks including possible dental damage if relevant, major complications, and any alternative forms of anesthetic management.   All of the patient's questions were answered to the best of my ability. The patient desires the anesthetic management as planned.  Guerrero Garcia MD  4/8/2025 7:15 AM  Present on Admission:  **None**           [1]   Facility-Administered Medications Prior to Admission   Medication Dose Route Frequency Provider Last Rate Last Admin    Etonogestrel IMPL 1 each  1 each Subdermal Once Jonathon Cunningham MD         Medications Prior to Admission   Medication Sig Dispense Refill Last Dose/Taking    BIOTIN OR Take 1 tablet by mouth daily.   Past Month    Ascorbic Acid (VITAMIN C OR) Take 1 tablet by mouth daily.   Past Month    MAGNESIUM OR Take 1 tablet by mouth daily.   Past Month    Cyanocobalamin (VITAMIN B 12 OR) Take 1 tablet by mouth daily.   Past Month   [2]   Current Facility-Administered Medications Ordered in Epic   Medication Dose Route Frequency Provider Last Rate Last Admin     lactated ringers infusion   Intravenous Continuous Idalmis Mark MD 20 mL/hr at 04/08/25 0618 New Bag at 04/08/25 0618    ceFAZolin (Ancef) 2g in 10mL IV syringe premix  2 g Intravenous Once Idalmis Mark MD         No current [facility]-ordered outpatient medications on file.   [3] No Known Allergies

## 2025-04-08 NOTE — ANESTHESIA PROCEDURE NOTES
Airway  Date/Time: 4/8/2025 7:37 AM  Urgency: Elective      General Information and Staff    Patient location during procedure: OR  Anesthesiologist: Guerrero Garcia MD  Performed: anesthesiologist   Performed by: Guerrero Garcia MD  Authorized by: Guerrero Garcia MD      Indications and Patient Condition  Indications for airway management: anesthesia  Sedation level: deep  Preoxygenated: yes  Patient position: sniffing  Mask difficulty assessment: 1 - vent by mask    Final Airway Details  Final airway type: endotracheal airway      Successful airway: ETT  Cuffed: yes   Successful intubation technique: direct laryngoscopy  Endotracheal tube insertion site: oral  Blade: Petra  Blade size: #3  ETT size (mm): 7.0    Cormack-Lehane Classification: grade IIA - partial view of glottis  Placement verified by: capnometry   Measured from: teeth  Number of attempts at approach: 1

## 2025-04-25 ENCOUNTER — NURSE ONLY (OUTPATIENT)
Dept: SURGERY | Facility: CLINIC | Age: 35
End: 2025-04-25
Payer: COMMERCIAL

## 2025-04-25 VITALS — SYSTOLIC BLOOD PRESSURE: 107 MMHG | DIASTOLIC BLOOD PRESSURE: 74 MMHG | HEART RATE: 98 BPM

## 2025-04-25 DIAGNOSIS — Z48.89 ENCOUNTER FOR POSTOPERATIVE CARE: Primary | ICD-10-CM

## 2025-04-25 PROCEDURE — 99024 POSTOP FOLLOW-UP VISIT: CPT

## 2025-04-25 NOTE — PROGRESS NOTES
S:  My Quiroga is a 35 year old female sp Albino Merae  Doing well, tolerating a general diet, generally normal bowel movements, no calf tenderness nor lower extremity edema, no shortness of breath, no chest pain.       O:  /74   Pulse 98   GEN:  No acute distress  L: nonlabored respirations  H: reg rate  Abd:  Soft, NT,ND, incisions C/D/I, no erythema.  Extr: No edema, no calf tenderness    Path:  Reviewed w pt    Assessment   1. Encounter for postoperative care      Doing well sp Albino Brar.  Activity as tolerated  Maintain a low fat diet.  Maintain good hydration.  F/u prn.         Nate Sandy PA-C

## (undated) DEVICE — GAMMEX® PI HYBRID SIZE 6.5, STERILE POWDER-FREE SURGICAL GLOVE, POLYISOPRENE AND NEOPRENE BLEND: Brand: GAMMEX

## (undated) DEVICE — [HIGH FLOW INSUFFLATOR,  DO NOT USE IF PACKAGE IS DAMAGED,  KEEP DRY,  KEEP AWAY FROM SUNLIGHT,  PROTECT FROM HEAT AND RADIOACTIVE SOURCES.]: Brand: PNEUMOSURE

## (undated) DEVICE — TISSUE RETRIEVAL SYSTEM: Brand: INZII RETRIEVAL SYSTEM

## (undated) DEVICE — SOLUTION IRRIG 1000ML 0.9% NACL USP BTL

## (undated) DEVICE — ADHESIVE SKIN TOP FOR WND CLSR DERMBND ADV

## (undated) DEVICE — TROCAR 12MM L100 HASSAN NON BLADED W/BALLOON

## (undated) DEVICE — CLIP APPLIER WITH CLIP LOGIC TECHNOLOGY: Brand: ENDO CLIP III

## (undated) DEVICE — TROCAR: Brand: KII FIOS FIRST ENTRY

## (undated) DEVICE — SUT COAT VCRL+ 0 27IN UR-6 ABSRB VLT ANTIBACT

## (undated) DEVICE — UNDYED BRAIDED (POLYGLACTIN 910), SYNTHETIC ABSORBABLE SUTURE: Brand: COATED VICRYL

## (undated) DEVICE — GLOVE SUR 6.5 SENSICARE PI MIC PIP CRM PWD F

## (undated) DEVICE — LAP CHOLE: Brand: MEDLINE INDUSTRIES, INC.

## (undated) DEVICE — MARYLAND JAW LAPAROSCOPIC SEALER/DIVIDER COATED: Brand: LIGASURE

## (undated) DEVICE — GLOVE SUR 6.5 SENSICARE PI PIP GRN PWD F

## (undated) DEVICE — SOLUTION IRRIG 3000ML 0.9% NACL FLX CONT

## (undated) DEVICE — TROCAR: Brand: KII® SLEEVE

## (undated) DEVICE — PLUMEPORT ACTIV LAPAROSCOPIC SMOKE FILTRATION DEVICE: Brand: PLUMEPORT ACTIVE

## (undated) NOTE — LETTER
AUTHORIZATION FOR SURGICAL OPERATION OR OTHER PROCEDURE    1.  I hereby authorize Dr. Eli Carlson, and [facility] staff assigned to my case to perform the following operation and/or procedure at the [facility]:    _________________________ ________ A. M.  P.M.        Patient Name:  ______________________________________________________  (please print)      Patient signature:  ___________________________________________________             Relationship to Patient:           []  Parent    Respon

## (undated) NOTE — LETTER
AUTHORIZATION FOR SURGICAL OPERATION OR OTHER PROCEDURE    1. I hereby authorize Dr. Evin Porter, and [facility] staff assigned to my case to perform the following operation and/or procedure at the [facility]:  Nexplanon insertion.     2.  My phys ___________________________________________________             Relationship to Patient:           []  Parent    Responsible person                          []  Spouse  In case of minor or                    [] Other  _____________   Incompetent name:  ___